# Patient Record
Sex: MALE | Race: BLACK OR AFRICAN AMERICAN | Employment: OTHER | URBAN - METROPOLITAN AREA
[De-identification: names, ages, dates, MRNs, and addresses within clinical notes are randomized per-mention and may not be internally consistent; named-entity substitution may affect disease eponyms.]

---

## 2019-12-02 ENCOUNTER — TRANSFERRED RECORDS (OUTPATIENT)
Dept: HEALTH INFORMATION MANAGEMENT | Facility: CLINIC | Age: 74
End: 2019-12-02

## 2020-01-08 ENCOUNTER — TRANSFERRED RECORDS (OUTPATIENT)
Dept: HEALTH INFORMATION MANAGEMENT | Facility: CLINIC | Age: 75
End: 2020-01-08

## 2020-01-16 ENCOUNTER — TELEPHONE (OUTPATIENT)
Dept: OTOLARYNGOLOGY | Facility: CLINIC | Age: 75
End: 2020-01-16

## 2020-01-16 NOTE — TELEPHONE ENCOUNTER
"[facility] Call Center    Phone Message    May a detailed message be left on voicemail: no    Reason for Call: Question regarding specialist protocol  Please follow protocols- only utilize this documentation for questions or concerns that are not clear in the protocol.  Contact clinic directly to clarify question(s) via phone or [facility] ([place]) message.   Was Clinic Available: yes, spoke to nurse Blank  Question regarding protocol:  DX not on protocol; per Tamanna with [facility], pt is currently inpatient at their facility. Dr. 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 Writer spoke with Blank in ENT, who stated this was not an appropriate dx for the clinic. Tamanna wanted to leave the direct number for Dr. Bullock, who thought ENT was appropriate. Tamanna is sending records over.   Is there a referral for the requested specialist/specialty? yes  Name of referring provider: Dr. Chandni Bullock (personal cell phone: [phone])  Location of referring provider: [facility] in [place]    Action Taken: Message routed to:  [facility] ENT  "

## 2020-01-17 NOTE — TELEPHONE ENCOUNTER
10+ pages of recs from [employer] [employer] [place] sent to scan, copy with nurse- Amay

## 2020-01-20 DIAGNOSIS — M86.119: Primary | ICD-10-CM

## 2020-01-20 NOTE — TELEPHONE ENCOUNTER
Referral routed to Cardiology/Thoracic Clinic Coordinators to coordinate scheduling as advised by Nuris NINO RN & Dr. Carballo 1/20.

## 2020-01-23 ENCOUNTER — TELEPHONE (OUTPATIENT)
Dept: SURGERY | Facility: CLINIC | Age: 75
End: 2020-01-23

## 2020-01-23 NOTE — TELEPHONE ENCOUNTER
I called Jay to schedule an appt with Dr Dennis as requested via [employer] from Verena [facility]. The phone number listed for Jay was disconnected, so I contacted his wife Asha at the number listed in Care Everywhere. Asha stated that Jay no longer has that phone as he is now in a nursing home. Asha is going to contact Jay'  & haver her call us to schedule this afternoon. I updated the number listed in Epic to Asha's number per her request. Jay was being referred to ENT from Dr. 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 Per referrals team all records have been scanned into his chart but imaging is still needed from [facility] (phone [phone], fax [phone]) IB sent to Verena with status update.